# Patient Record
Sex: FEMALE | Race: WHITE | NOT HISPANIC OR LATINO | Employment: FULL TIME | URBAN - METROPOLITAN AREA
[De-identification: names, ages, dates, MRNs, and addresses within clinical notes are randomized per-mention and may not be internally consistent; named-entity substitution may affect disease eponyms.]

---

## 2017-01-06 ENCOUNTER — OFFICE VISIT (OUTPATIENT)
Dept: URGENT CARE | Facility: CLINIC | Age: 22
End: 2017-01-06
Payer: COMMERCIAL

## 2017-01-06 VITALS
TEMPERATURE: 98.4 F | WEIGHT: 130 LBS | BODY MASS INDEX: 20.4 KG/M2 | HEART RATE: 58 BPM | OXYGEN SATURATION: 97 % | SYSTOLIC BLOOD PRESSURE: 102 MMHG | RESPIRATION RATE: 16 BRPM | DIASTOLIC BLOOD PRESSURE: 60 MMHG | HEIGHT: 67 IN

## 2017-01-06 DIAGNOSIS — H10.32 ACUTE BACTERIAL CONJUNCTIVITIS OF LEFT EYE: ICD-10-CM

## 2017-01-06 DIAGNOSIS — H00.014 HORDEOLUM EXTERNUM OF LEFT UPPER EYELID: ICD-10-CM

## 2017-01-06 PROCEDURE — 99203 OFFICE O/P NEW LOW 30 MIN: CPT | Performed by: FAMILY MEDICINE

## 2017-01-06 RX ORDER — ERYTHROMYCIN 5 MG/G
OINTMENT OPHTHALMIC
Qty: 1 TUBE | Refills: 0 | Status: SHIPPED | OUTPATIENT
Start: 2017-01-06 | End: 2017-01-06 | Stop reason: SDUPTHER

## 2017-01-06 RX ORDER — ERYTHROMYCIN 5 MG/G
OINTMENT OPHTHALMIC
Qty: 1 TUBE | Refills: 0 | Status: SHIPPED | OUTPATIENT
Start: 2017-01-06 | End: 2019-12-09

## 2017-01-06 ASSESSMENT — VISUAL ACUITY
OD_CC: 20/20
OS_CC: 20/25

## 2017-01-06 NOTE — MR AVS SNAPSHOT
"Mary Jang   2017 8:15 AM   Office Visit   MRN: [id number]    Department:  [facility]   Dept Phone:  [phone]    Description:  Female : 1995   Provider:  Leisa Dickerson D.O.           Reason for Visit     Belepharitis left eye swollen drainage,  red, drainage, itchy, using hot water and honey to wash it out      Allergies as of 2017     No Known Allergies      You were diagnosed with     Hordeolum externum of left upper eyelid   [532394]       Acute bacterial conjunctivitis of left eye   [[id number]]         Vital Signs     Blood Pressure Pulse Temperature Respirations Height Weight    102/60 mmHg 58 36.9 °C (98.4 °F) 16 1.702 m (5' 7\") 58.968 kg (130 lb)    Body Mass Index Oxygen Saturation Last Menstrual Period Breastfeeding?          20.36 kg/m2 97% 2016 No        Basic Information     Date Of Birth Sex Race Ethnicity Preferred Language    1995 Female White  Origin (Hungarian,Tongan,French,American, etc) English      Health Maintenance     Patient has no pending health maintenance at this time      Current Immunizations     Tuberculin Skin Test 2016  9:54 AM, 2016 10:30 AM      Below and/or attached are the medications your provider expects you to take. Review all of your home medications and newly ordered medications with your provider and/or pharmacist. Follow medication instructions as directed by your provider and/or pharmacist. Please keep your medication list with you and share with your provider. Update the information when medications are discontinued, doses are changed, or new medications (including over-the-counter products) are added; and carry medication information at all times in the event of emergency situations     Allergies:  No Known Allergies          Medications  Valid as of: 2017 -  8:42 AM    Generic Name Brand Name Tablet Size Instructions for use    Erythromycin (Ointment) erythromycin 5 MG/GM 1/2 inch " ribbon bid to affected eyelid for 7 days        .                 Medicines prescribed today were sent to:     [facility], [place] - [address] [facility]    [address] [facility] [place] [address]    Phone: [phone] Fax: [phone]    Open 24 Hours?: No      Medication refill instructions:       If your prescription bottle indicates you have medication refills left, it is not necessary to call your provider’s office. Please contact your pharmacy and they will refill your medication.    If your prescription bottle indicates you do not have any refills left, you may request refills at any time through one of the following ways: The online [employer] system (except Urgent Care), by calling your provider’s office, or by asking your pharmacy to contact your provider’s office with a refill request. Medication refills are processed only during regular business hours and may not be available until the next business day. Your provider may request additional information or to have a follow-up visit with you prior to refilling your medication.   *Please Note: Medication refills are assigned a new Rx number when refilled electronically. Your pharmacy may indicate that no refills were authorized even though a new prescription for the same medication is available at the pharmacy. Please request the medicine by name with the pharmacy before contacting your provider for a refill.           [employer] Access Code: Activation code not generated  Current [employer] Status: Active

## 2017-01-06 NOTE — Clinical Note
January 6, 2017         Patient: Mary Jang   YOB: 1995   Date of Visit: 1/6/2017           To Whom it May Concern:    Mary Jang was seen in my clinic on 1/6/2017. Please excuse patient from work due to illness. She may return on 1/7 or 1/9/17.      If you have any questions or concerns, please don't hesitate to call.        Sincerely,           Leisa Dickerson D.O.  Electronically Signed

## 2017-01-06 NOTE — PROGRESS NOTES
"Subjective:      Mary Jang is a 21 y.o. female who presents with Belepharitis   patient presents to urgent care with left eyelid swelling and drainage she states that it was matted shut this morning yesterday she started with her eyes looking slightly pink the eyelid is not swollen at that time. 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 No fevers or chills no cough or nasal congestion.      HPI  ROS  PMH:  has no past medical history on file.  MEDS:   Current outpatient prescriptions:   •  erythromycin 5 MG/GM Ointment, 1/2 inch ribbon bid to affected eyelid for 7 days, Disp: 1 Tube, Rfl: 0  ALLERGIES: No Known Allergies  SURGHX: No past surgical history on file.  SOCHX:    FH: Family history was reviewed, no pertinent findings to report     Objective:     /60 mmHg  Pulse 58  Temp(Src) 36.9 °C (98.4 °F)  Resp 16  Ht 1.702 m (5' 7\")  Wt 58.968 kg (130 lb)  BMI 20.36 kg/m2  SpO2 97%  LMP 12/25/2016  Breastfeeding? No     Physical Exam   Constitutional: She is oriented to person, place, and time. She appears well-developed and well-nourished. No distress.   HENT:   Mouth/Throat: Oropharynx is clear and moist.   Eyes: EOM are normal. Pupils are equal, round, and reactive to light. Left eye exhibits hordeolum. Left conjunctiva is injected.       Neck: Normal range of motion.   Cardiovascular: Normal rate.    Pulmonary/Chest: Effort normal.   Musculoskeletal: Normal range of motion.   Neurological: She is alert and oriented to person, place, and time.   Skin: Skin is warm and dry. She is not diaphoretic. There is erythema.   Psychiatric: She has a normal mood and affect. Her behavior is normal.            Assessment/Plan:     1. Hordeolum externum of left upper eyelid    - erythromycin 5 MG/GM Ointment; 1/2 inch ribbon bid to affected eyelid for 7 days  Dispense: 1 Tube; Refill: 0    2. Acute bacterial conjunctivitis of left eye    - erythromycin 5 MG/GM Ointment; 1/2 inch " ribbon bid to affected eyelid for 7 days  Dispense: 1 Tube; Refill: 0

## 2017-11-17 ENCOUNTER — NON-PROVIDER VISIT (OUTPATIENT)
Dept: OCCUPATIONAL MEDICINE | Facility: CLINIC | Age: 22
End: 2017-11-17

## 2017-11-17 DIAGNOSIS — Z11.1 ENCOUNTER FOR PPD TEST: Primary | ICD-10-CM

## 2017-11-17 PROCEDURE — 86580 TB INTRADERMAL TEST: CPT | Performed by: PREVENTIVE MEDICINE

## 2017-11-19 ENCOUNTER — NON-PROVIDER VISIT (OUTPATIENT)
Dept: URGENT CARE | Facility: CLINIC | Age: 22
End: 2017-11-19

## 2017-11-19 LAB — TB WHEAL 3D P 5 TU DIAM: NORMAL MM

## 2017-11-19 NOTE — PROGRESS NOTES
Mary Jang is a 22 y.o. female here for a non-provider visit for PPD reading -- Step 1 of 1.      1.  Resulted in Epic under enter/edit results? Yes   2.  TB evaluation questionnaire scanned into chart and original given to patient?Yes      3. Was induration greater than 0 mm? No.      Routed to PCP? No

## 2018-10-29 ENCOUNTER — EMPLOYEE HEALTH (OUTPATIENT)
Dept: OCCUPATIONAL MEDICINE | Facility: CLINIC | Age: 23
End: 2018-10-29

## 2018-10-29 ENCOUNTER — HOSPITAL ENCOUNTER (OUTPATIENT)
Facility: MEDICAL CENTER | Age: 23
End: 2018-10-29
Attending: PREVENTIVE MEDICINE
Payer: COMMERCIAL

## 2018-10-29 ENCOUNTER — EH NON-PROVIDER (OUTPATIENT)
Dept: OCCUPATIONAL MEDICINE | Facility: CLINIC | Age: 23
End: 2018-10-29

## 2018-10-29 VITALS
OXYGEN SATURATION: 96 % | DIASTOLIC BLOOD PRESSURE: 66 MMHG | RESPIRATION RATE: 16 BRPM | HEART RATE: 67 BPM | HEIGHT: 67 IN | SYSTOLIC BLOOD PRESSURE: 108 MMHG | BODY MASS INDEX: 19.3 KG/M2 | TEMPERATURE: 99.3 F | WEIGHT: 123 LBS

## 2018-10-29 DIAGNOSIS — Z02.1 PRE-EMPLOYMENT HEALTH SCREENING EXAMINATION: ICD-10-CM

## 2018-10-29 DIAGNOSIS — Z02.1 PRE-EMPLOYMENT DRUG SCREENING: ICD-10-CM

## 2018-10-29 PROCEDURE — 86480 TB TEST CELL IMMUN MEASURE: CPT | Performed by: PREVENTIVE MEDICINE

## 2018-10-29 PROCEDURE — 8915 PR COMPREHENSIVE PHYSICAL: Performed by: PREVENTIVE MEDICINE

## 2018-10-29 NOTE — PROGRESS NOTES
Patient's body mass index is 19.26 kg/m². Exercise and nutrition counseling were performed at this visit.

## 2018-10-31 LAB
M TB TUBERC IFN-G BLD QL: NEGATIVE
M TB TUBERC IFN-G/MITOGEN IGNF BLD: -0
M TB TUBERC IGNF/MITOGEN IGNF CONTROL: 49.7 [IU]/ML
MITOGEN IGNF BCKGRD COR BLD-ACNC: 0.02 [IU]/ML

## 2018-11-16 ENCOUNTER — EH NON-PROVIDER (OUTPATIENT)
Dept: OCCUPATIONAL MEDICINE | Facility: CLINIC | Age: 23
End: 2018-11-16

## 2018-11-16 DIAGNOSIS — Z71.85 IMMUNIZATION COUNSELING: ICD-10-CM

## 2018-11-16 NOTE — PROGRESS NOTES
Pre-employment medical surveillance reviewed and signed. Quantiferon result documented in immunizations. Document returned to monthly assigned MA.

## 2019-09-16 ENCOUNTER — HOSPITAL ENCOUNTER (OUTPATIENT)
Dept: LAB | Facility: MEDICAL CENTER | Age: 24
End: 2019-09-16
Payer: COMMERCIAL

## 2019-09-16 LAB
BDY FAT % MEASURED: 15.6 %
BP DIAS: 53 MMHG
BP SYS: 102 MMHG
CHOLEST SERPL-MCNC: 164 MG/DL (ref 100–199)
DIABETES HTDIA: NO
EVENT NAME HTEVT: NORMAL
FASTING HTFAS: YES
GLUCOSE SERPL-MCNC: 87 MG/DL (ref 65–99)
HDLC SERPL-MCNC: 63 MG/DL
HYPERTENSION HTHYP: NO
LDLC SERPL CALC-MCNC: 86 MG/DL
SCREENING LOC CITY HTCIT: NORMAL
SCREENING LOC STATE HTSTA: NORMAL
SCREENING LOCATION HTLOC: NORMAL
SMOKING HTSMO: NO
SUBSCRIBER ID HTSID: NORMAL
TRIGL SERPL-MCNC: 74 MG/DL (ref 0–149)

## 2019-09-16 PROCEDURE — 36415 COLL VENOUS BLD VENIPUNCTURE: CPT

## 2019-09-16 PROCEDURE — S5190 WELLNESS ASSESSMENT BY NONPH: HCPCS

## 2019-09-16 PROCEDURE — 80061 LIPID PANEL: CPT

## 2019-09-16 PROCEDURE — 82947 ASSAY GLUCOSE BLOOD QUANT: CPT

## 2019-10-02 ENCOUNTER — DOCUMENTATION (OUTPATIENT)
Dept: OCCUPATIONAL MEDICINE | Facility: CLINIC | Age: 24
End: 2019-10-02

## 2019-10-02 ENCOUNTER — EH NON-PROVIDER (OUTPATIENT)
Dept: OCCUPATIONAL MEDICINE | Facility: CLINIC | Age: 24
End: 2019-10-02

## 2019-10-02 DIAGNOSIS — Z02.89 ENCOUNTER FOR OCCUPATIONAL HEALTH EXAMINATION INVOLVING RESPIRATOR: Primary | ICD-10-CM

## 2019-10-02 PROCEDURE — 94375 RESPIRATORY FLOW VOLUME LOOP: CPT | Performed by: PREVENTIVE MEDICINE

## 2019-12-09 ENCOUNTER — OFFICE VISIT (OUTPATIENT)
Dept: URGENT CARE | Facility: CLINIC | Age: 24
End: 2019-12-09
Payer: COMMERCIAL

## 2019-12-09 VITALS
HEART RATE: 68 BPM | WEIGHT: 130 LBS | DIASTOLIC BLOOD PRESSURE: 68 MMHG | SYSTOLIC BLOOD PRESSURE: 110 MMHG | OXYGEN SATURATION: 99 % | RESPIRATION RATE: 20 BRPM | BODY MASS INDEX: 20.36 KG/M2 | TEMPERATURE: 98 F

## 2019-12-09 DIAGNOSIS — B35.0 TINEA CAPITIS: Primary | ICD-10-CM

## 2019-12-09 DIAGNOSIS — L01.00 IMPETIGO: ICD-10-CM

## 2019-12-09 PROCEDURE — 99214 OFFICE O/P EST MOD 30 MIN: CPT | Performed by: PHYSICIAN ASSISTANT

## 2019-12-09 RX ORDER — CEPHALEXIN 500 MG/1
500 CAPSULE ORAL 4 TIMES DAILY
Qty: 28 CAP | Refills: 0 | Status: SHIPPED | OUTPATIENT
Start: 2019-12-09 | End: 2019-12-16

## 2019-12-09 RX ORDER — CLOTRIMAZOLE 1 %
CREAM (GRAM) TOPICAL
Qty: 1 TUBE | Refills: 3 | Status: SHIPPED | OUTPATIENT
Start: 2019-12-09 | End: 2020-01-14

## 2019-12-09 RX ORDER — FLUCONAZOLE 200 MG/1
200 TABLET ORAL DAILY
Qty: 10 TAB | Refills: 0 | Status: SHIPPED | OUTPATIENT
Start: 2019-12-09 | End: 2019-12-19

## 2019-12-09 RX ORDER — PREDNISONE 20 MG/1
TABLET ORAL
Qty: 23 TAB | Refills: 0 | Status: SHIPPED | OUTPATIENT
Start: 2019-12-09 | End: 2020-01-14

## 2019-12-10 NOTE — PROGRESS NOTES
Subjective:      PT is a 24 y.o. female who presents with Rash (Few wks red and dryness on scalp )            HPI  This is a new problem. PT notes spreading red itching rash of scalp x 1 month. Pt notes yellow crusting and flaking of skin. Pt has not taken any Rx medications for this condition. Pt states the pain is a 6/10 from itching, aching in nature and worse at night. Pt denies new detergents, soaps, make-up, hygiene products, medications, foods, exposure to chemicals.   Pt denies CP, SOB, NVD, paresthesias, headaches, dizziness, change in vision, hives, or other joint pain. The pt's medication list, problem list, and allergies have been evaluated and reviewed during today's visit.    PMH:  Negative per pt.      PSH:  Negative per pt.      Fam Hx:  the patient's family history is not pertinent to their current complaint      Soc HX:  Social History     Socioeconomic History   • Marital status: Single     Spouse name: Not on file   • Number of children: Not on file   • Years of education: Not on file   • Highest education level: Not on file   Occupational History   • Not on file   Social Needs   • Financial resource strain: Not on file   • Food insecurity:     Worry: Not on file     Inability: Not on file   • Transportation needs:     Medical: Not on file     Non-medical: Not on file   Tobacco Use   • Smoking status: Never Smoker   • Smokeless tobacco: Never Used   Substance and Sexual Activity   • Alcohol use: Yes     Comment: occ   • Drug use: No   • Sexual activity: Not on file   Lifestyle   • Physical activity:     Days per week: Not on file     Minutes per session: Not on file   • Stress: Not on file   Relationships   • Social connections:     Talks on phone: Not on file     Gets together: Not on file     Attends Rastafari service: Not on file     Active member of club or organization: Not on file     Attends meetings of clubs or organizations: Not on file     Relationship status: Not on file   • Intimate  partner violence:     Fear of current or ex partner: Not on file     Emotionally abused: Not on file     Physically abused: Not on file     Forced sexual activity: Not on file   Other Topics Concern   • Not on file   Social History Narrative   • Not on file         Medications:    Current Outpatient Medications:   •  predniSONE (DELTASONE) 20 MG Tab, Take 3 tabs at once PO daily x 5 days, then take 2 tabs at once daily x 3 days, then take 1 tab PO daily x 2 days, Disp: 23 Tab, Rfl: 0  •  cephALEXin (KEFLEX) 500 MG Cap, Take 1 Cap by mouth 4 times a day for 7 days., Disp: 28 Cap, Rfl: 0  •  clotrimazole (LOTRIMIN) 1 % Cream, Apply to affected area bid x 14 days, Disp: 1 Tube, Rfl: 3  •  fluconazole (DIFLUCAN) 200 MG Tab, Take 1 Tab by mouth every day for 10 days., Disp: 10 Tab, Rfl: 0      Allergies:  Patient has no known allergies.    ROS  Constitutional: Negative for fever, chills and malaise/fatigue.   HENT: Negative for congestion and sore throat.    Eyes: Negative for blurred vision, double vision and photophobia.   Respiratory: Negative for cough and shortness of breath.  Cardiovascular: Negative for chest pain and palpitations.   Gastrointestinal: Negative for heartburn, nausea, vomiting, abdominal pain, diarrhea and constipation.   Genitourinary: Negative for dysuria and flank pain.   Musculoskeletal: Negative for joint pain and myalgias.   Skin: POS for scalp itching and rash.   Neurological: Negative for dizziness, tingling and headaches.   Endo/Heme/Allergies: Does not bruise/bleed easily.   Psychiatric/Behavioral: Negative for depression. The patient is not nervous/anxious.           Objective:     /68   Pulse 68   Temp 36.7 °C (98 °F) (Temporal)   Resp 20   Wt 59 kg (130 lb)   SpO2 99%   BMI 20.36 kg/m²      Physical Exam  Skin:     General: Skin is warm.      Capillary Refill: Capillary refill takes less than 2 seconds.      Findings: Erythema and rash present. Rash is crusting and scaling.       Nails: There is no clubbing.                Constitutional: PT is oriented to person, place, and time. PT appears well-developed and well-nourished. No distress.   HENT:   Head: Normocephalic and atraumatic.   Mouth/Throat: Oropharynx is clear and moist. No oropharyngeal exudate.   Eyes: Conjunctivae normal and EOM are normal. Pupils are equal, round, and reactive to light.   Neck: Normal range of motion. Neck supple. No thyromegaly present.   Cardiovascular: Normal rate, regular rhythm, normal heart sounds and intact distal pulses.  Exam reveals no gallop and no friction rub.    No murmur heard.  Pulmonary/Chest: Effort normal and breath sounds normal. No respiratory distress. PT has no wheezes. PT has no rales. Pt exhibits no tenderness.   Abdominal: Soft. Bowel sounds are normal. PT exhibits no distension and no mass. There is no tenderness. There is no rebound and no guarding.   Musculoskeletal: Normal range of motion. PT exhibits no edema and no tenderness.   Neurological: PT is alert and oriented to person, place, and time. PT has normal reflexes. No cranial nerve deficit.       Psychiatric: PT has a normal mood and affect. PT behavior is normal. Judgment and thought content normal.             Assessment/Plan:       1. Tinea capitis  2. Impetigo    - predniSONE (DELTASONE) 20 MG Tab; Take 3 tabs at once PO daily x 5 days, then take 2 tabs at once daily x 3 days, then take 1 tab PO daily x 2 days  Dispense: 23 Tab; Refill: 0  - cephALEXin (KEFLEX) 500 MG Cap; Take 1 Cap by mouth 4 times a day for 7 days.  Dispense: 28 Cap; Refill: 0  - clotrimazole (LOTRIMIN) 1 % Cream; Apply to affected area bid x 14 days  Dispense: 1 Tube; Refill: 3  - fluconazole (DIFLUCAN) 200 MG Tab; Take 1 Tab by mouth every day for 10 days.  Dispense: 10 Tab; Refill: 0      Rest, fluids encouraged.  AVS with medical info given.  Pt was in full understanding and agreement with the plan.  Differential diagnosis, natural history,  supportive care, and indications for immediate follow-up discussed. All questions answered. Patient agrees with the plan of care.  Follow-up as needed if symptoms worsen or fail to improve to PCP, Urgent care or Emergency Room.

## 2020-01-09 ENCOUNTER — TELEPHONE (OUTPATIENT)
Dept: SCHEDULING | Facility: IMAGING CENTER | Age: 25
End: 2020-01-09

## 2020-01-14 ENCOUNTER — OFFICE VISIT (OUTPATIENT)
Dept: MEDICAL GROUP | Facility: PHYSICIAN GROUP | Age: 25
End: 2020-01-14
Payer: COMMERCIAL

## 2020-01-14 VITALS
HEIGHT: 67 IN | TEMPERATURE: 99.3 F | OXYGEN SATURATION: 100 % | HEART RATE: 56 BPM | BODY MASS INDEX: 20.28 KG/M2 | RESPIRATION RATE: 12 BRPM | DIASTOLIC BLOOD PRESSURE: 52 MMHG | SYSTOLIC BLOOD PRESSURE: 86 MMHG | WEIGHT: 129.2 LBS

## 2020-01-14 DIAGNOSIS — Z30.019 ENCOUNTER FOR FEMALE BIRTH CONTROL: ICD-10-CM

## 2020-01-14 DIAGNOSIS — G60.0 CHARCOT-MARIE-TOOTH DISEASE: ICD-10-CM

## 2020-01-14 DIAGNOSIS — L30.9 ECZEMA, UNSPECIFIED TYPE: ICD-10-CM

## 2020-01-14 PROCEDURE — 99214 OFFICE O/P EST MOD 30 MIN: CPT | Performed by: PHYSICIAN ASSISTANT

## 2020-01-14 RX ORDER — CLOBETASOL PROPIONATE 0.05 G/100ML
SHAMPOO TOPICAL
Qty: 1 BOTTLE | Refills: 2 | Status: SHIPPED | OUTPATIENT
Start: 2020-01-14

## 2020-01-14 RX ORDER — FLUCONAZOLE 200 MG/1
200 TABLET ORAL DAILY
COMMUNITY
End: 2020-01-14

## 2020-01-14 RX ORDER — NORGESTIMATE AND ETHINYL ESTRADIOL 7DAYSX3 LO
1 KIT ORAL DAILY
Qty: 84 TAB | Refills: 0 | Status: SHIPPED | OUTPATIENT
Start: 2020-01-14 | End: 2020-03-24 | Stop reason: SDUPTHER

## 2020-01-14 RX ORDER — TRIAMCINOLONE ACETONIDE 1 MG/G
1 CREAM TOPICAL 2 TIMES DAILY PRN
Qty: 1 TUBE | Refills: 3 | Status: SHIPPED | OUTPATIENT
Start: 2020-01-14

## 2020-01-14 SDOH — HEALTH STABILITY: MENTAL HEALTH: HOW OFTEN DO YOU HAVE A DRINK CONTAINING ALCOHOL?: MONTHLY OR LESS

## 2020-01-14 SDOH — HEALTH STABILITY: MENTAL HEALTH: HOW MANY STANDARD DRINKS CONTAINING ALCOHOL DO YOU HAVE ON A TYPICAL DAY?: 1 OR 2

## 2020-01-14 SDOH — HEALTH STABILITY: MENTAL HEALTH: HOW OFTEN DO YOU HAVE 6 OR MORE DRINKS ON ONE OCCASION?: NEVER

## 2020-01-14 ASSESSMENT — PATIENT HEALTH QUESTIONNAIRE - PHQ9: CLINICAL INTERPRETATION OF PHQ2 SCORE: 0

## 2020-01-14 NOTE — PATIENT INSTRUCTIONS
Clobetasol Propionate Shampoo  What is this medicine?  CLOBETASOL (kloe BAY ta sol) is a corticosteroid. It is used on the skin to treat itching, redness, and swelling caused by some skin conditions.  This medicine may be used for other purposes; ask your health care provider or pharmacist if you have questions.  COMMON BRAND NAME(S): Clobex, Clodan  What should I tell my health care provider before I take this medicine?  They need to know if you have any of these conditions:  -any type of active infection including measles, tuberculosis, herpes, or chickenpox  -circulation problems or vascular disease  -large areas of burned or damaged skin  -rosacea  -skin wasting or thinning  -an unusual or allergic reaction to clobetasol, corticosteroids, other medicines, foods, dyes, or preservatives  -pregnant or trying to get pregnant  -breast-feeding  How should I use this medicine?  This medicine is for external use only. Do not take by mouth. Follow the directions on the prescription label. Wash your hands before and after use. Do not wet hair before using the shampoo. Move the hair and apply the shampoo directly to the affected area of the scalp. Gently rub in the shampoo and leave on the scalp for 15 minutes. Do not cover your head with a shower cap or bathing cap while the shampoo is on your scalp. After 15 minutes, add water, lather, and rinse your scalp completely. Do not get this medicine in your eyes. If you do, rinse out with plenty of cool tap water. Do not use your medicine more often than directed or for longer than ordered by your doctor or health care professional. To do so may increase the chance of side effects.  Talk to your pediatrician regarding the use of this medicine in children. Special care may be needed.  Elderly patients are more likely to have damaged skin through aging, and this may increase side effects. This medicine should only be used for brief periods and infrequently in older  patients.  Overdosage: If you think you have taken too much of this medicine contact a poison control center or emergency room at once.  NOTE: This medicine is only for you. Do not share this medicine with others.  What if I miss a dose?  If you miss a dose, use it as soon as you can. If it is almost time for your next dose, use only that dose. Do not use double or extra doses.  What may interact with this medicine?  Interactions are not expected. Do not use cosmetics or other skin care products on the treated area.  This list may not describe all possible interactions. Give your health care provider a list of all the medicines, herbs, non-prescription drugs, or dietary supplements you use. Also tell them if you smoke, drink alcohol, or use illegal drugs. Some items may interact with your medicine.  What should I watch for while using this medicine?  Tell your doctor or health care professional if your symptoms do not get better within 2 weeks, or if you develop skin irritation from the medicine.  Tell your doctor or health care professional if you are exposed to anyone with measles or chickenpox, or if you develop sores or blisters that do not heal properly.  What side effects may I notice from receiving this medicine?  Side effects that you should report to your doctor or health care professional as soon as possible:  -allergic reactions like skin rash, itching or hives, swelling of the face, lips, or tongue  -changes in vision  -lack of healing of the skin condition  -painful, red, pus filled blisters on the skin or in hair follicles  -thinning of the skin with easy bruising  Side effects that usually do not require medical attention (report to your doctor or health care professional if they continue or are bothersome):  -burning, irritation of the skin  -redness or scaling of the skin  This list may not describe all possible side effects. Call your doctor for medical advice about side effects. You may report  side effects to FDA at 0-967-FDA-9047.  Where should I keep my medicine?  Keep out of the reach of children.  Store at room temperature between 20 and 25 degrees C (68 and 77 degrees F). Keep away from heat and direct light. Do not freeze. Throw away any unused medicine after the expiration date.  NOTE: This sheet is a summary. It may not cover all possible information. If you have questions about this medicine, talk to your doctor, pharmacist, or health care provider.  © 2018 Elsevier/Gold Standard (2009-04-01 17:30:21)  Triamcinolone skin cream, ointment, lotion, or aerosol  What is this medicine?  TRIAMCINOLONE (trye am SIN oh lone) is a corticosteroid. It is used on the skin to reduce swelling, redness, itching, and allergic reactions.  This medicine may be used for other purposes; ask your health care provider or pharmacist if you have questions.  COMMON BRAND NAME(S): Aristocort, Aristocort A, Aristocort HP, Cinalog, Cinolar, DERMASORB TA Complete, Flutex, Kenalog, Pediaderm TA, SP Rx 228, Triacet, Trianex, Triderm  What should I tell my health care provider before I take this medicine?  They need to know if you have any of these conditions:  -diabetes  -infection, like tuberculosis, herpes, or fungal infection  -large areas of burned or damaged skin  -skin wasting or thinning  -an unusual or allergic reaction to triamcinolone, corticosteroids, other medicines, foods, dyes, or preservatives  -pregnant or trying to get pregnant  -breast-feeding  How should I use this medicine?  This medicine is for external use only. Do not take by mouth. Follow the directions on the prescription label. Wash your hands before and after use. Apply a thin film of medicine to the affected area. Do not cover with a bandage or dressing unless your doctor or health care professional tells you to. Do not use on healthy skin or over large areas of skin. Do not get this medicine in your eyes. If you do, rinse out with plenty of cool tap  water. It is important not to use more medicine than prescribed. Do not use your medicine more often than directed.  Talk to your pediatrician regarding the use of this medicine in children. Special care may be needed.  Elderly patients are more likely to have damaged skin through aging, and this may increase side effects. This medicine should only be used for brief periods and infrequently in older patients.  Overdosage: If you think you have taken too much of this medicine contact a poison control center or emergency room at once.  NOTE: This medicine is only for you. Do not share this medicine with others.  What if I miss a dose?  If you miss a dose, use it as soon as you can. If it is almost time for your next dose, use only that dose. Do not use double or extra doses.  What may interact with this medicine?  Interactions are not expected.  This list may not describe all possible interactions. Give your health care provider a list of all the medicines, herbs, non-prescription drugs, or dietary supplements you use. Also tell them if you smoke, drink alcohol, or use illegal drugs. Some items may interact with your medicine.  What should I watch for while using this medicine?  Tell your doctor or health care professional if your symptoms do not start to get better within one week. Do not use for more than 14 days. Do not use on healthy skin or over large areas of skin. Tell your doctor or health care professional if you are exposed to anyone with measles or chickenpox, or if you develop sores or blisters that do not heal properly.  Do not use an airtight bandage to cover the affected area unless your doctor or health care professional tells you to. If you are to cover the area, follow the instructions carefully. Covering the area where the medicine is applied can increase the amount that passes through the skin and increases the risk of side effects.  If treating the diaper area of a child, avoid covering the  treated area with tight-fitting diapers or plastic pants. This may increase the amount of medicine that passes through the skin and increase the risk of serious side effects.  What side effects may I notice from receiving this medicine?  Side effects that you should report to your doctor or health care professional as soon as possible:  -burning or itching of the skin  -dark red spots on the skin  -infection  -painful, red, pus filled blisters in hair follicles  -thinning of the skin, sunburn more likely especially on the face  Side effects that usually do not require medical attention (report to your doctor or health care professional if they continue or are bothersome):  -dry skin, irritation  -unusual increased growth of hair on the face or body  This list may not describe all possible side effects. Call your doctor for medical advice about side effects. You may report side effects to FDA at 3-353-FDA-7225.  Where should I keep my medicine?  Keep out of the reach of children.  Store at room temperature between 15 and 30 degrees C (59 and 86 degrees F). Do not freeze. Throw away any unused medicine after the expiration date.  NOTE: This sheet is a summary. It may not cover all possible information. If you have questions about this medicine, talk to your doctor, pharmacist, or health care provider.  © 2018 Elsevier/Gold Standard (2015-04-09 15:59:51)

## 2020-01-14 NOTE — PROGRESS NOTES
Subjective:   Mary Jang is a 25 y.o. female here today for skin rash. Is a new patient to me and is also establishing care today.    Previous PCP:     HPI:    Patient presents to the office today to establish care with me. She would like the following addressed today:    -complains of red, itchy rash. States started behind ears which she typically will get in the winter months. Attributes to eczema. Started new job in September which was associated with a lot of stress and shortly thereafter starting noticing spreading of the rash down her neck and upper chest. 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 Used OTC lice shampoo and afterwards noticed inflammation, crusting, and severe itching to entire scalp which has continued since then. Went to [facility] last month and was given multiple medications including oral prednisone, oral diflucan, topical clotrimazole, and Keflex. Noticed very transient improvement but then rash returned. Not currently using any treatments.    -is interested in starting birth control. Most interested in the pill. Was taking back in high school but stopped due to weight gain. Is sexually active and wishing to avoid pregnancy, however, so would like to re-start. Is potentially interested in alternative method but would like to start with the pill and do more research. She is non-smoker with no history of blood clots or migraine headaches. LMP 1/3/20. Denies change of pregnancy due to protected intercourse.    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 Has high foot arches and tight tendons in legs. Has custom insoles that she wears. Does not feel symptoms are functionally limiting at all. Her sister, paternal uncle, and paternal grandmother also have this condition.       Current medicines (including changes today)  Current Outpatient Medications   Medication Sig Dispense Refill   • Norgestim-Eth Estrad Triphasic  "(TRI-LO-SPRINTEC) 0.18/0.215/0.25 MG-25 MCG Tab Take 1 Tab by mouth every day. 84 Tab 0   • triamcinolone acetonide (KENALOG) 0.1 % Cream Apply 1 Application to affected area(s) 2 times a day as needed. 2 weeks on, 2 weeks off as-needed. 1 Tube 3   • Clobetasol Propionate 0.05 % Shampoo Apply to entire scalp while showering daily. Leave on for 5 minutes and then rinse out. 2 weeks on, 2 weeks off as-needed. 1 Bottle 2     No current facility-administered medications for this visit.      She  has no past medical history on file.    ROS  As per HPI.       Objective:     BP (!) 86/52 (BP Location: Right arm, Patient Position: Sitting, BP Cuff Size: Adult)   Pulse (!) 56   Temp 37.4 °C (99.3 °F) (Temporal)   Resp 12   Ht 1.702 m (5' 7\")   Wt 58.6 kg (129 lb 3.2 oz)   SpO2 100%  Body mass index is 20.24 kg/m².     Physical Exam:  Constitutional: Alert, well-appearing, very pleasant, no distress.  Skin: Warm, dry, good turgor. There are multiple dry, scaly, erythematous patches noted on the lateral/posterior neck and upper chest. Skin of posterior scalp is erythematous with thick build-up of dry skin/flaking. The rest of the scalp is very dry with visible dandruff.  Eye: Pupils are equal and round, conjunctiva clear, lids normal.  ENMT: Lips without lesions, moist mucus membranes.  Neck: Rash as above. No edema.  Respiratory: Unlabored respiratory effort, lungs clear to auscultation, no wheezes, no rhonchi.  Cardiovascular: Normal S1, S2, no murmur.  Musculoskeletal: High foot arches noted.      Assessment and Plan:   The following treatment plan was discussed    1. Eczema, unspecified type  New problem to me, uncontrolled.  Rash is consistent with eczema flareup.  We discussed that mainstay of treatment involves topical steroids.  Should discontinue previously prescribed medications.  I have prescribed triamcinolone cream as well as clobetasol shampoo for the affected areas on her scalp.  She was counseled on how " to use these medications and advised not to use for more than 2 weeks at a time given potential for skin atrophy and color change.  We discussed the importance of a good moisturizing regimen after the steroid treatment to help prevent future flareups.  - triamcinolone acetonide (KENALOG) 0.1 % Cream; Apply 1 Application to affected area(s) 2 times a day as needed. 2 weeks on, 2 weeks off as-needed.  Dispense: 1 Tube; Refill: 3  - Clobetasol Propionate 0.05 % Shampoo; Apply to entire scalp while showering daily. Leave on for 5 minutes and then rinse out. 2 weeks on, 2 weeks off as-needed.  Dispense: 1 Bottle; Refill: 2    2. Encounter for female birth control  Patient was counseled on available birth control methods.  She feels that she may be interested in more of a long-acting birth control but would like to start with the pill.  Tri-Lo-Sprintec prescribed.  She has never had a Pap smear so advised her to schedule a well woman exam with me at her earliest convenience.  - Norgestim-Eth Estrad Triphasic (TRI-LO-SPRINTEC) 0.18/0.215/0.25 MG-25 MCG Tab; Take 1 Tab by mouth every day.  Dispense: 84 Tab; Refill: 0    3. Charcot-Mary-Tooth disease  New problem to me, stable. She already has insoles and does not feel significantly bothered or limited by symptoms. Will continue to monitor.      Followup: Return for well-woman/pap, Short.    Unique Etienne P.A.-C.

## 2020-01-28 ENCOUNTER — OFFICE VISIT (OUTPATIENT)
Dept: MEDICAL GROUP | Facility: PHYSICIAN GROUP | Age: 25
End: 2020-01-28
Payer: COMMERCIAL

## 2020-01-28 ENCOUNTER — HOSPITAL ENCOUNTER (OUTPATIENT)
Facility: MEDICAL CENTER | Age: 25
End: 2020-01-28
Attending: PHYSICIAN ASSISTANT
Payer: COMMERCIAL

## 2020-01-28 VITALS
DIASTOLIC BLOOD PRESSURE: 64 MMHG | RESPIRATION RATE: 12 BRPM | SYSTOLIC BLOOD PRESSURE: 80 MMHG | OXYGEN SATURATION: 98 % | BODY MASS INDEX: 21.11 KG/M2 | HEIGHT: 67 IN | WEIGHT: 134.5 LBS | TEMPERATURE: 98.5 F | HEART RATE: 55 BPM

## 2020-01-28 DIAGNOSIS — Z11.3 SCREENING FOR STDS (SEXUALLY TRANSMITTED DISEASES): ICD-10-CM

## 2020-01-28 DIAGNOSIS — Z01.419 ENCOUNTER FOR ROUTINE GYNECOLOGICAL EXAMINATION WITH PAPANICOLAOU SMEAR OF CERVIX: ICD-10-CM

## 2020-01-28 DIAGNOSIS — Z23 NEED FOR VACCINATION: ICD-10-CM

## 2020-01-28 PROBLEM — Z78.9 USES BIRTH CONTROL: Status: ACTIVE | Noted: 2020-01-28

## 2020-01-28 LAB — CYTOLOGY REG CYTOL: NORMAL

## 2020-01-28 PROCEDURE — 90651 9VHPV VACCINE 2/3 DOSE IM: CPT | Performed by: PHYSICIAN ASSISTANT

## 2020-01-28 PROCEDURE — 90471 IMMUNIZATION ADMIN: CPT | Performed by: PHYSICIAN ASSISTANT

## 2020-01-28 PROCEDURE — 88175 CYTOPATH C/V AUTO FLUID REDO: CPT

## 2020-01-28 PROCEDURE — 87491 CHLMYD TRACH DNA AMP PROBE: CPT

## 2020-01-28 PROCEDURE — 99395 PREV VISIT EST AGE 18-39: CPT | Mod: 25 | Performed by: PHYSICIAN ASSISTANT

## 2020-01-28 PROCEDURE — 87591 N.GONORRHOEAE DNA AMP PROB: CPT

## 2020-01-28 NOTE — PROGRESS NOTES
Chief Complaint: Well-woman    Mary Jang is a 25 y.o. female who presents for annual gynecologic exam with pap smear. Is an established patient of mine.      Pt has GYN provider: no  Last pap smear: N/A  Gardasil: No  Last Td: 11/1/17    Gynecologic concerns today: no    Patient specifically denies any vaginal discharge, itching, burning, foul odor, genital lesions, pelvic pain. She is in a monogamous sexual relationship with one male partner. No current concern for STDs.    She also denies any breast concerns. She specifically denies any breast skin changes, palpable lumps, nipple discharge, breast tenderness, or swollen axillary lymph nodes.    No family history of gynecologic cancers.    She  has no past medical history on file.  She  has a past surgical history that includes tonsillectomy and adenoidectomy (Bilateral, 2012) and dental extraction(s).  Current Outpatient Medications   Medication Sig Dispense Refill   • Norgestim-Eth Estrad Triphasic (TRI-LO-SPRINTEC) 0.18/0.215/0.25 MG-25 MCG Tab Take 1 Tab by mouth every day. 84 Tab 0   • triamcinolone acetonide (KENALOG) 0.1 % Cream Apply 1 Application to affected area(s) 2 times a day as needed. 2 weeks on, 2 weeks off as-needed. 1 Tube 3   • Clobetasol Propionate 0.05 % Shampoo Apply to entire scalp while showering daily. Leave on for 5 minutes and then rinse out. 2 weeks on, 2 weeks off as-needed. 1 Bottle 2     No current facility-administered medications for this visit.      Social History     Tobacco Use   • Smoking status: Never Smoker   • Smokeless tobacco: Never Used   Substance Use Topics   • Alcohol use: Yes     Frequency: Monthly or less     Drinks per session: 1 or 2     Binge frequency: Never     Comment: occ   • Drug use: No       Review Of Systems  As above.      PHYSICAL EXAMINATION:  BP (!) 80/64 (BP Location: Right arm, Patient Position: Sitting, BP Cuff Size: Adult)   Pulse (!) 55   Temp 36.9 °C (98.5 °F)   Resp 12   Ht 1.702 m  "(5' 7\")   Wt 61 kg (134 lb 8 oz)   SpO2 98%   Body mass index is 21.07 kg/m².  Wt Readings from Last 4 Encounters:   01/28/20 61 kg (134 lb 8 oz)   01/14/20 58.6 kg (129 lb 3.2 oz)   12/09/19 59 kg (130 lb)   01/06/17 59 kg (130 lb)       Constitutional: Alert, well-appearing, no distress.  Skin: Warm, dry, good turgor, no rashes or suspicious lesions in visible areas.  Eye: Conjunctivae clear, lids normal.  ENMT: Lips without lesions, moist mucus membranes.  Breast: Breasts are normal-appearing and symmetric bilaterally. No skin abnormalities visualized. There are no masses palpated, no tenderness. No visible nipple discharge. No axillary or supraclavicular lymphadenopathy palpated.  Pelvic: Normal-appearing external genitalia. No genital lesions noted. On speculum insertion, vaginal walls have normal rugated appearance. No abnormal-appearing discharge is noted. Cervix easily visualized and appears pink, non-friable and without visible lesions. On bimanual exam, there is no cervical motion tenderness. Uterus is mobile, non-tender and without palpable masses. No adnexal masses or tenderness.    A chaperone was offered to the patient during today's exam. Patient declined chaperone.     ASSESSMENT/PLAN:    1. Encounter for routine gynecological examination with Papanicolaou smear of cervix  Normal exam today. Patient will be notified of pap results when back.   HCM:  Reviewed and discussed with patient  Immunizations per orders  - THINPREP PAP,REFLEX HPV ON ASC-US ONLY; Future    2. Screening for STDs (sexually transmitted diseases)  - CHLAMYDIA/GC PCR URINE OR SWAB; Future    3. Need for vaccination  Patient has not yet received Gardasil vaccine and is interested in completing series.   - Gardasil 9      Return for Annual; Short.      Unique Etienne P.A.-C.      "

## 2020-01-29 LAB
C TRACH DNA SPEC QL NAA+PROBE: NEGATIVE
N GONORRHOEA DNA SPEC QL NAA+PROBE: NEGATIVE
SPECIMEN SOURCE: NORMAL

## 2020-03-24 DIAGNOSIS — Z30.019 ENCOUNTER FOR FEMALE BIRTH CONTROL: ICD-10-CM

## 2020-03-26 RX ORDER — NORGESTIMATE AND ETHINYL ESTRADIOL 7DAYSX3 LO
1 KIT ORAL DAILY
Qty: 84 TAB | Refills: 3 | Status: SHIPPED | OUTPATIENT
Start: 2020-03-26 | End: 2020-06-17 | Stop reason: SDUPTHER

## 2020-06-17 DIAGNOSIS — Z30.019 ENCOUNTER FOR FEMALE BIRTH CONTROL: ICD-10-CM

## 2020-06-19 RX ORDER — NORGESTIMATE AND ETHINYL ESTRADIOL 7DAYSX3 LO
1 KIT ORAL DAILY
Qty: 84 TAB | Refills: 3 | Status: SHIPPED | OUTPATIENT
Start: 2020-06-19